# Patient Record
Sex: MALE | Race: WHITE | URBAN - METROPOLITAN AREA
[De-identification: names, ages, dates, MRNs, and addresses within clinical notes are randomized per-mention and may not be internally consistent; named-entity substitution may affect disease eponyms.]

---

## 2017-11-29 ENCOUNTER — HISTORICAL (OUTPATIENT)
Dept: ADMINISTRATIVE | Facility: HOSPITAL | Age: 57
End: 2017-11-29

## 2017-12-27 ENCOUNTER — HISTORICAL (OUTPATIENT)
Dept: PREADMISSION TESTING | Facility: HOSPITAL | Age: 57
End: 2017-12-27

## 2017-12-27 LAB
BUN SERPL-MCNC: 16 MG/DL (ref 7–18)
CALCIUM SERPL-MCNC: 9.1 MG/DL (ref 8.5–10.1)
CHLORIDE SERPL-SCNC: 100 MMOL/L (ref 98–107)
CO2 SERPL-SCNC: 27 MMOL/L (ref 21–32)
CREAT SERPL-MCNC: 0.76 MG/DL (ref 0.7–1.3)
GLUCOSE SERPL-MCNC: 91 MG/DL (ref 74–106)
POTASSIUM SERPL-SCNC: 5.2 MMOL/L (ref 3.5–5.1)
SODIUM SERPL-SCNC: 137 MMOL/L (ref 136–145)

## 2018-01-08 ENCOUNTER — HISTORICAL (OUTPATIENT)
Dept: SURGERY | Facility: HOSPITAL | Age: 58
End: 2018-01-08

## 2018-04-11 ENCOUNTER — HISTORICAL (OUTPATIENT)
Dept: ADMINISTRATIVE | Facility: HOSPITAL | Age: 58
End: 2018-04-11

## 2018-04-26 ENCOUNTER — HISTORICAL (OUTPATIENT)
Dept: SURGERY | Facility: HOSPITAL | Age: 58
End: 2018-04-26

## 2018-05-02 ENCOUNTER — HISTORICAL (OUTPATIENT)
Dept: ADMINISTRATIVE | Facility: HOSPITAL | Age: 58
End: 2018-05-02

## 2018-05-30 ENCOUNTER — HISTORICAL (OUTPATIENT)
Dept: ADMINISTRATIVE | Facility: HOSPITAL | Age: 58
End: 2018-05-30

## 2018-06-20 ENCOUNTER — HISTORICAL (OUTPATIENT)
Dept: ADMINISTRATIVE | Facility: HOSPITAL | Age: 58
End: 2018-06-20

## 2018-07-18 ENCOUNTER — HISTORICAL (OUTPATIENT)
Dept: ADMINISTRATIVE | Facility: HOSPITAL | Age: 58
End: 2018-07-18

## 2018-09-26 ENCOUNTER — HISTORICAL (OUTPATIENT)
Dept: ADMINISTRATIVE | Facility: HOSPITAL | Age: 58
End: 2018-09-26

## 2018-11-15 ENCOUNTER — HISTORICAL (OUTPATIENT)
Dept: SURGERY | Facility: HOSPITAL | Age: 58
End: 2018-11-15

## 2018-11-21 ENCOUNTER — HISTORICAL (OUTPATIENT)
Dept: ADMINISTRATIVE | Facility: HOSPITAL | Age: 58
End: 2018-11-21

## 2018-12-11 ENCOUNTER — HISTORICAL (OUTPATIENT)
Dept: ADMINISTRATIVE | Facility: HOSPITAL | Age: 58
End: 2018-12-11

## 2019-01-08 ENCOUNTER — HISTORICAL (OUTPATIENT)
Dept: ADMINISTRATIVE | Facility: HOSPITAL | Age: 59
End: 2019-01-08

## 2019-03-12 ENCOUNTER — HISTORICAL (OUTPATIENT)
Dept: ADMINISTRATIVE | Facility: HOSPITAL | Age: 59
End: 2019-03-12

## 2022-04-07 ENCOUNTER — HISTORICAL (OUTPATIENT)
Dept: ADMINISTRATIVE | Facility: HOSPITAL | Age: 62
End: 2022-04-07

## 2022-04-24 VITALS
BODY MASS INDEX: 25.21 KG/M2 | HEIGHT: 69 IN | DIASTOLIC BLOOD PRESSURE: 78 MMHG | WEIGHT: 170.19 LBS | SYSTOLIC BLOOD PRESSURE: 115 MMHG

## 2022-04-28 NOTE — OP NOTE
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    OPERATION PERFORMED:  Repeat fusion, right first metatarsophalangeal joint with bone marrow aspirate and injection.    INDICATIONS:  This 57-year-old had the above-mentioned problem.  He had failed conservative therapy.  He had a couple of broken screws and he had a partially fused 1st MTP joint and he did have pain.  He desired operative intervention.  The risks and benefits of the proposed and alternative treatment were explained to the patient.  Questions were solicited and answered.  No assurance given.  Informed consent was obtained.    OPERATION IN DETAIL:  After appropriate operative consents were obtained, the patient was taken to the operating room and placed on the operative in the supine position.  General laryngeal anesthesia was induced without difficulty.  The skin on the right foot and right iliac crest were prepped and draped in a sterile manner using ChloraPrep.     Attention was turned to the right iliac crest first.  Using the Arthrex Aren system, a small stab wound was made over the anterior iliac crest.  After the syringes had been prepared, a trocar was placed in the iliac crest and 60 cc of bone marrow was aspirated without difficulty.  Sterile dressing was placed on this area, and the bone marrow aspirate was prepared, yielding 3 cc of pure bone marrow aspirate with 35 cc of platelet poor plasma.  The bone marrow aspirate was brought back onto the field in a sterile fashion.     Attention was then turned to the right lower extremity.  After exsanguination with an Esmarch bandage, a previously placed thigh tourniquet was elevated.  Previous incision was opened.  Careful dissection was done as the extensor tendon was retracted out of the way.   Patient had intact and broken screws.  The plate was removed, all the broken screws were removed that I could reach were taken out.  Next, the joint was exposed.  He had a partial fusion.  I did make a keyhole with a saw on the dorsum aspect of this 1st MTP joint, and I removed any fibrous tissue.  Next, using a pin, I drilled through and through to have good fenestration of both cortices and to stimulate new bone formation.  Next, 1 cc of the bone marrow aspirate was placed within the joint surface itself.  Next, using the Synthes first MTP fusion, a long 10 degree plate was selected, and locking screws were placed in the holes.  This entire process was watched on the C-arm.  Next, I mixed the remaining 2 cc of bone marrow aspirate with 2 cc of DBX, and this was placed at the joint line and underneath the plate until the area was thoroughly full.  Intraoperative x-rays with the C-arm showed good position of the hardware, and good position of his 1st MTP fusion.  The wound was then gently irrigated.  The extensor tendon was centralized over the plate.  The wound was closed in a layered interrupted fashion with nylon sutures on the skin.  Sterile dressings were applied.  A compressive dressing was applied.  The tourniquet was deflated.     Lap count, sponge count correct x2.  Estimated blood loss was minimal.  Patient tolerated the procedure without difficulty and was transferred to recovery in stable condition.        ______________________________  MD TENZIN Dinero/DEJA  DD:  11/15/2018  Time:  10:47AM  DT:  11/15/2018  Time:  11:03AM  Job #:  [id number]

## 2022-04-28 NOTE — OP NOTE
DATE OF SURGERY:    04/26/2018    SURGEON:  PRESTON De La Cruz MD    ASSISTANT:  Oren Kent certified first assistant.    PREOPERATIVE DIAGNOSIS:  Severe right first metatarsophalangeal joint arthritis.    POSTOPERATIVE DIAGNOSIS:  Severe right first metatarsophalangeal joint arthritis.    OPERATIVE PROCEDURE:  Right first metatarsophalangeal joint fusion.    INDICATIONS FOR OPERATION:  This 57-year-old gentleman had the above-mentioned problem.  I had removed osteophytes from his great toe to give him some relief but he felt that he still had enough pain where he desired a first MTP arthrodesis.  The risks and benefits of the proposed and alternative treatment were explained to the patient.  Questions were solicited and answered.  No assurance given.  Informed consent was obtained.    OPERATION IN DETAIL:  After appropriate operative consents were obtained the patient was taken to the operating room and placed on the operating table in the supine position.  General laryngeal tracheal mask anesthesia was induced without difficulty.  The skin on the right foot was prepped and draped in a sterile manner using ChloraPrep.  After exsanguination with an Esmarch bandage, a previously placed thigh tourniquet was elevated.  The previous incision was opened and dissection was carried down more proximal and distal to both sides of this incision.  Full-thickness flaps were developed on the medial side of the extensor tendon complex and this was retracted out of the way.  Next, soft tissue dissection was done to remove any impinging soft tissue and then very thin wafers of bone were removed from the metatarsal and the proximal phalanx, giving a flat cut osteotomy.  This entire process was watched on the C-arm.  I was able to manually compress this into good position.  It was pinned provisionally with a K-wire.  Next using the Synthes first MTP plate system, I selected a small 0 degree plate and it fit the  patient appropriately.  It was provisionally fixed with an olive wire as I checked my positioning and a combination of locking and cortex screws was used to fix the plate to both the metatarsal and the proximal phalanx.  Intraoperative x-ray showed good reduction of the first MTP joint and good placement of all hardware.  DBX was used to fill any voids.  The wound was thoroughly irrigated.  All bleeders were coagulated.  The extensor complex was used to cover the plate and was closed with interrupted Vicryl sutures.  The wound was then closed in a layered interrupted fashion with nylon sutures on the skin.  Sterile dressings were applied and a compressive dressing was applied.  The tourniquet was deflated.       Lap count and sponge count correct x2.  Estimated blood loss was minimal.  The patient tolerated the procedure without difficulty and was transferred to Recovery Room in stable condition.        ______________________________  M. Robina De La Cruz MD    [facility]/  DD:  04/26/2018  Time:  10:10AM  DT:  04/27/2018  Time:  06:10AM  Job #:  [id number]

## 2022-04-28 NOTE — OP NOTE
DATE OF SURGERY:    01/08/2018    SURGEON:  PRESTON De La Cruz MD    ASSISTANT:  Oren Kent, Certified First Assistant.    PREOPERATIVE DIAGNOSIS:  Hallux rigidus, right foot.    POSTOPERATIVE DIAGNOSIS:  Hallux rigidus, right foot.    OPERATIVE PROCEDURE:  Excision, dorsal osteophytes right 1st metatarsophalangeal joint.    INDICATION FOR OPERATION:  This 57-year-old gentleman had the above-mentioned problem.  He had failed conservative therapy and desired operative intervention.  The risks and benefits of the proposed and alternative treatment were explained to the patient.  Questions were solicited and answered.  No assurance given.  Informed consent was obtained.    OPERATION IN DETAIL:  After appropriate operative consents were obtained, the patient was taken to the operating room, placed on the operating table in supine position.  General laryngeal tracheal mask anesthesia was induced without difficulty.   __________ performed an ankle block for postoperative pain control.  The skin on the right leg was prepped and draped in a sterile manner using ChloraPrep.  After exsanguination with an Esmarch bandage, a previously placed thigh tourniquet was elevated.  Standard dorsal approach to the first MTP joint was done.  Careful dissection was done.  An incision on the medial side of the extensor tendon and careful dissection was done down to the joint.  The C-arm was brought in.  Osteophytes were identified.  The patient was found to have significant osteoarthritic changes with a loss of about 50% of his cartilaginous surface on the first metatarsal.  There was good cartilaginous coverage on the 1st proximal phalanx.  Next, using a saw, osteophytes were removed dorsally from the 1st proximal phalanx as well as from the first metatarsal as well as medially and laterally where other osteophytes were noted.  At this point, I was able to dorsiflex his toe 40 degrees.  Prior, he could only dorsiflex it  to 0 and plantar flex his toe 35 degrees.  The wound was thoroughly irrigated.  All bleeders were coagulated.  The extensor sandoval was closed with interrupted Vicryl suture.  The wound was then closed in a layered, interrupted fashion with nylon sutures on the skin.  Sterile dressings were applied and a compressive dressing was applied.  The tourniquet was deflated.       Lap count and sponge count were correct times two.  Estimated blood loss was minimal.  The patient tolerated the procedure without difficulty and was transferred to Recovery Room in stable condition.        ______________________________  M. Robina De La Cruz MD    TENZIN/KELLEE  DD:  01/08/2018  Time:  08:07AM  DT:  01/08/2018  Time:  04:10PM  Job #:  [id number]

## 2022-05-02 NOTE — HISTORICAL [facility] C[facility]ER
This is a historical note converted from [employer]. Formatting and pictures may have been removed.  Please reference [employer] for original formatting and attached multimedia. Chief Complaint  RT foot, postop of 1st MTP fusion. PL - 2  History of Present Illness  This 57-year-old returns for his right foot.? He is 6 days status post?right first MTP fusion. ?The patient is complaining of less pain than before surgery.? He states that he is no longer using Percocet.  Review of Systems  Constitutional: No fever, weakness, or fatigue.  Ear/Nose/Mouth/Throat: No nasal congestion or sore throat.  Respiratory: No shortness of breath or cough.  Cardiovascular: No chest pain, palpitations, or peripheral edema.  Gastrointestinal: No nausea, vomiting, or abdominal pain.  Genitourinary: No dysuria.  Musculoskeletal: See current complaints  Integumentary: Negative.  Physical Exam  Vitals & Measurements  HR:?62(Peripheral)? BP:?125/87?  HT:?175?cm? HT:?175?cm? WT:?78?kg? WT:?78?kg? BMI:?25.47?  Physical examination shows that the patients wound is clean and dry.?It ?Is not yet completely healed.? He has no evidence of infection. ?He has moderate swelling.? He is motor and sensory intact.? Sterile dressings were applied?and his?heel?bearing shoe?was reapplied.  ?  AP lateral and oblique of the right foot shows that his alignment is in good position and his hardware is in good position.  Assessment/Plan  1.?Primary osteoarthritis of right foot  ? The findings were reviewed with the patient and he expressed understanding.? I will see him back in 1 week for suture removal.? He understands that he cannot yet bathe his foot?completely. ?He may wash his lesser toes but not his incision line.? He is to?continue to use his Darco wedge shoe. ?He is instructed to call if he has any problems prior to his?next appointment.  Ordered:  Clinic Follow up, *Est. 05/09/18 3:00:00 CDT, Order for future visit, Primary osteoarthritis of right foot   Orthopedic aftercare  Encounter for change of dressing, [facility] [place]  Post-Op follow-up visit [id number] PC, Primary osteoarthritis of right foot  Orthopedic aftercare  Encounter for change of dressing, KRISTINAMD JEROD - RICARDA [place], 05/02/18 9:28:00 CDT  ?  2.?Orthopedic aftercare  Ordered:  Clinic Follow up, *Est. 05/09/18 3:00:00 CDT, Order for future visit, Primary osteoarthritis of right foot  Orthopedic aftercare  Encounter for change of dressing, [facility] [place]  Post-Op follow-up visit [id number] PC, Primary osteoarthritis of right foot  Orthopedic aftercare  Encounter for change of dressing, LGMD AMB - AOADRIAN [place], 05/02/18 9:28:00 CDT  ?  3.?Encounter for change of dressing  Ordered:  Clinic Follow up, *Est. 05/09/18 3:00:00 CDT, Order for future visit, Primary osteoarthritis of right foot  Orthopedic aftercare  Encounter for change of dressing, LGMD CHOWDARY [place]  Post-Op follow-up visit [id number] PC, Primary osteoarthritis of right foot  Orthopedic aftercare  Encounter for change of dressing, LGMD JEROD - RICARDA [place], 05/02/18 9:28:00 CDT  ?  Orders:  XR Foot Right Minimum 3 Views, Routine, 05/02/18 8:00:00 CDT, Pain, None, Stretcher, Patient Has IV?, Rad Type, Right foot pain, Not Scheduled, 05/02/18 8:00:00 CDT   Problem List/Past Medical History  Ongoing  Bone spur  Gastric reflux  Hallux rigidus, bilateral  Orthopedic aftercare  Pain of great toe  Primary osteoarthritis of right foot  Historical  No qualifying data  Procedure/Surgical History  Arthrodesis, great toe; metatarsophalangeal joint (04/26/2018), Fusion of Right Metatarsal-Phalangeal Joint with Internal Fixation Device, Open Approach (04/26/2018), Joint Fusion Hallux MTP (Right) (04/26/2018), Excision Ankle Osteophyte (Right) (01/08/2018), Excision of Right Metatarsal, Open Approach (01/08/2018), Excision of Right Toe Phalanx, Open Approach (01/08/2018), Excision or curettage of bone cyst or benign tumor, phalanges of foot (01/08/2018), Excision or  curettage of bone cyst or benign tumor, tarsal or metatarsal, except talus or calcaneus; (01/08/2018), Removal of toenail (1999).  Medications  Percocet 10/325 oral tablet, 2 tab(s), Oral, q6hr, PRN,? ?Not taking  Allergies  penicillin?(pt reports unsure of reaction. reaction was as a young child)  sulfa drugs?(Hives)  Social History  Alcohol  Current, 12/27/2017  Employment/School  Employed, 11/29/2017  Substance Abuse  Never, 11/29/2017  Tobacco  Never smoker, 11/29/2017  Family History  Diabetes mellitus type 2: Mother.  Hyperlipidemia.: Mother and Father.  Hypertension.: Father.  Metastatic cancer: Sister.  Renal disease: Mother.

## 2022-05-02 NOTE — HISTORICAL [facility] C[facility]ER
This is a historical note converted from [employer]. Formatting and pictures may have been removed.  Please reference [employer] for original formatting and attached multimedia. Chief Complaint  RT foot, 3 month follow up with xrays. Patient states foot has been hurting  History of Present Illness  This 57-year-old comes back for his right foot. ?He is 3 months status post?dorsal colectomy of the right first MTP joint. ?He states that that he has better motion he still has significant arthritic pain and would like to discuss a fusion.? He is accompanied by his wife today.  Review of Systems  Constitutional: No fever, weakness, or fatigue.  Ear/Nose/Mouth/Throat: No nasal congestion or sore throat.  Respiratory: No shortness of breath or cough.  Cardiovascular: No chest pain, palpitations, or peripheral edema.  Gastrointestinal: No nausea, vomiting, or abdominal pain.  Genitourinary: No dysuria.  Musculoskeletal: See current complaints; left foot pain  Integumentary: Negative.  Physical Exam  Vitals & Measurements  HT:?175?cm? HT:?175?cm? WT:?79?kg? WT:?79?kg? BMI:?25.8?  Physical examination shows that his alignment is straight.? Dorsiflexion passively is 25? and plantar flexion is 35?.? Actively he has pain with dorsiflexion particularly and he has crepitance consistent with arthritic pain.? He is motor and sensory intact.  ?  AP lateral and oblique of the right foot shows removal of his dorsal osteophyte. ?However he still has significant arthritic changes at his first MTP joint.  Assessment/Plan  1.?Hallux rigidus, bilateral  ? The findings were reviewed with the patient and he expressed understanding.? He is interested in scheduling a first MTP fusion of the right foot in the near future.? I will see him back for preoperative paperwork.? He is instructed to call if he has any problems prior to his next appointment.  Ordered:  Clinic Follow-up Pre Op, 04/11/18 9:44:00 CDT, Order for future visit, Krupa higuera,  bilateral  Orthopedic aftercare  Primary osteoarthritis of right foot, [facility] [place]  Office/Outpatient Visit Level 3 Established [id number] PC, Hallux rigidus, bilateral  Orthopedic aftercare  Primary osteoarthritis of right foot, LGMD AMB - [facility] [place], 04/11/18 9:44:00 CDT  Schedule Surgery, R Foot 1st MTP Fusion, Patients request, 04/11/18 9:44:00 CDT  ?  2.?Orthopedic aftercare  Ordered:  Clinic Follow-up Pre Op, 04/11/18 9:44:00 CDT, Order for future visit, Hallux rigidus, bilateral  Orthopedic aftercare  Primary osteoarthritis of right foot, LG[facility] [place]  Office/Outpatient Visit Level 3 Established [id number] PC, Hallux rigidus, bilateral  Orthopedic aftercare  Primary osteoarthritis of right foot, LGMD AMB - [facility] [place], 04/11/18 9:44:00 CDT  Schedule Surgery, R Foot 1st MTP Fusion, Patients request, 04/11/18 9:44:00 CDT  ?  3.?Primary osteoarthritis of right foot  Ordered:  Clinic Follow-up Pre Op, 04/11/18 9:44:00 CDT, Order for future visit, Hallux rigidus, bilateral  Orthopedic aftercare  Primary osteoarthritis of right foot, LG[facility] [place]  Office/Outpatient Visit Level 3 Established [id number] PC, Hallux rigidus, bilateral  Orthopedic aftercare  Primary osteoarthritis of right foot, LGMD AMB - [facility] [place], 04/11/18 9:44:00 CDT  Schedule Surgery, R Foot 1st MTP Fusion, Patients request, 04/11/18 9:44:00 CDT  ?   Problem List/Past Medical History  Ongoing  Bone spur  Gastric reflux  Hallux rigidus, bilateral  Orthopedic aftercare  Pain of great toe  Primary osteoarthritis of right foot  Historical  No qualifying data  Procedure/Surgical History  Excision Ankle Osteophyte (Right) (01/08/2018), Excision of Right Metatarsal, Open Approach (01/08/2018), Excision of Right Toe Phalanx, Open Approach (01/08/2018), Excision or curettage of bone cyst or benign tumor, phalanges of foot (01/08/2018), Excision or curettage of bone cyst or benign tumor, tarsal or metatarsal, except talus or calcaneus;  (01/08/2018), Removal of toenail (1999).  Medications  No active medications  Allergies  penicillin?(pt reports unsure of reaction. reaction was as a young child)  sulfa drugs?(Hives)  Social History  Alcohol  Current, 12/27/2017  Employment/School  Employed, 11/29/2017  Substance Abuse  Never, 11/29/2017  Tobacco  Never smoker, 11/29/2017  Family History  Diabetes mellitus type 2: Mother.  Hyperlipidemia.: Mother and Father.  Hypertension.: Father.  Metastatic cancer: Sister.  Renal disease: Mother.

## 2022-05-02 NOTE — HISTORICAL [facility] CERNER
This is a historical note converted from [employer]. Formatting and pictures may have been removed.  Please reference [employer] for original formatting and attached multimedia. Chief Complaint  Pt. here for follow up for Rt. foot. pt. states everything feels good with foot.  History of Present Illness  This 57-year-old returns for his right foot.? He is 3 weeks status post right first MTP arthrodesis.? He is complaining of no pain. ?He is accompanied by his wife today.  Review of Systems  Constitutional: No fever, weakness, or fatigue.  Ear/Nose/Mouth/Throat: No nasal congestion or sore throat.  Respiratory: No shortness of breath or cough.  Cardiovascular: No chest pain, palpitations, or peripheral edema.  Gastrointestinal: No nausea, vomiting, or abdominal pain.  Genitourinary: No dysuria.  Musculoskeletal: See current complaints  Integumentary: Negative.  Physical Exam  Vitals & Measurements  HT:?175?cm? HT:?175?cm? WT:?78?kg? WT:?78?kg? BMI:?25.47?  Physical examination shows that he has?wound has healed uneventfully.? His alignment is straight. ?There is no motion at his first MTP joint.? He is motor and sensory intact.  ?  AP lateral and oblique of the right foot shows that his alignment is maintained and his hardware position is maintained.? His fusion is estimated to be 50% solid.  Assessment/Plan  1.?Joint arthrodesis status  ? The patient is to continue using his postoperative shoe.? I will see him back in 3 weeks with new x-rays.? He is instructed to call if he has any problems prior to his next appointment.? He requested no pain medication.  Ordered:  Clinic Follow up, *Est. 06/20/18 3:00:00 CDT, Order for future visit, Joint arthrodesis status  Orthopedic aftercare, [facility]  Post-Op follow-up visit [id number] , Joint arthrodesis status  Orthopedic aftercare, [facility], 05/30/18 9:18:00 CDT  XR Foot Right Minimum 3 Views, Routine, *Est. 06/20/18 3:00:00 CDT, Post Reduction, None,  Ambulatory, Patient Has IV?, Rad Type, Order for future visit, Joint arthrodesis status  Orthopedic aftercare, Not Scheduled, *Est. 06/20/18 3:00:00 CDT  ?  2.?Orthopedic aftercare  Ordered:  Clinic Follow up, *Est. 06/20/18 3:00:00 CDT, Order for future visit, Joint arthrodesis status  Orthopedic aftercare, [facility] [place]  Post-Op follow-up visit [id number] PC, Joint arthrodesis status  Orthopedic aftercare, [facility] [place], 05/30/18 9:18:00 CDT  XR Foot Right Minimum 3 Views, Routine, *Est. 06/20/18 3:00:00 CDT, Post Reduction, None, Ambulatory, Patient Has IV?, Rad Type, Order for future visit, Joint arthrodesis status  Orthopedic aftercare, Not Scheduled, *Est. 06/20/18 3:00:00 CDT  ?   Problem List/Past Medical History  Ongoing  Bone spur  Gastric reflux  Hallux rigidus, bilateral  Joint arthrodesis status  Orthopedic aftercare  Pain of great toe  Primary osteoarthritis of right foot  Historical  No qualifying data  Procedure/Surgical History  Arthrodesis, great toe; metatarsophalangeal joint (04/26/2018), Fusion of Right Metatarsal-Phalangeal Joint with Internal Fixation Device, Open Approach (04/26/2018), Joint Fusion Hallux MTP (Right) (04/26/2018), Excision Ankle Osteophyte (Right) (01/08/2018), Excision of Right Metatarsal, Open Approach (01/08/2018), Excision of Right Toe Phalanx, Open Approach (01/08/2018), Excision or curettage of bone cyst or benign tumor, phalanges of foot (01/08/2018), Excision or curettage of bone cyst or benign tumor, tarsal or metatarsal, except talus or calcaneus; (01/08/2018), Removal of toenail (1999).  Medications  No active medications  Allergies  penicillin?(pt reports unsure of reaction. reaction was as a young child)  sulfa drugs?(Hives)  Social History  Alcohol  Current, 12/27/2017  Employment/School  Employed, 11/29/2017  Substance Abuse  Never, 11/29/2017  Tobacco  Never smoker, 11/29/2017  Family History  Diabetes mellitus type 2: Mother.  Hyperlipidemia.:  Mother and Father.  Hypertension.: Father.  Metastatic cancer: Sister.  Renal disease: Mother.

## 2022-05-02 NOTE — HISTORICAL [facility]
This is a historical note converted from Karen. Formatting and pictures may have been removed.  Please reference [employer] for original formatting and attached multimedia. Chief Complaint  8 week S/P repeat fusion right 1st MTP joint, bone marrow aspiration-Sx 11/15/18-gl 2/13/19-patient is doing well in normal shoe gear  History of Present Illness  This 58-year-old is now?7 weeks status post repeat fusion of the right first MTP joint. ?He states that he is pain-free.? He began wearing some?steel toe boots and he states that he feels well.  Review of Systems  Constitutional: No fever, weakness, or fatigue.  Ear/Nose/Mouth/Throat: No nasal congestion or sore throat.  Respiratory: No shortness of breath or cough.  Cardiovascular: No chest pain, palpitations, or peripheral edema.  Gastrointestinal: No nausea, vomiting, or abdominal pain.  Genitourinary: No dysuria.  Musculoskeletal: See current complaints  Integumentary: Negative.  Physical Exam  Vitals & Measurements  BP:?115/78?  HT:?175?cm? HT:?175?cm? WT:?77.2?kg? WT:?77.2?kg? BMI:?25.21?  Physical examination shows that the patient still has some mild swelling.? His MTP fusion appears to be solid clinically. ?He is motor and sensory intact. ?He has no pain at his first MTP joint.  ?  AP lateral and oblique of the right foot shows that his fusion is progressing nicely. ?It is estimated to be 75-80% solid.  Assessment/Plan  1.?Joint arthrodesis status?Z98.1  ? The [place] of [place]. ?I will see him back in 6 weeks with new x-rays.? He is instructed to call if he has any problems prior to his next appointment.? He is encouraged to continue to ice and elevate his foot.  Ordered:  Clinic Follow up, *Est. 02/19/19 3:00:00 CST, Order for future visit, Joint arthrodesis status  Orthopedic aftercare, [facility]opa[facility]  Post-Op follow-up visit [id number] , Joint arthrodesis status  Orthopedic aftercare, Orthopaedics, 01/08/19 14:44:00 CST  XR Foot Right  Minimum 3 Views, Routine, *Est. 02/19/19 3:00:00 CST, Post Reduction, None, Ambulatory, Rad Type, Order for future visit, Joint arthrodesis status  Orthopedic aftercare, Not Scheduled, *Est. 02/19/19 3:00:00 CST  ?  2.?Orthopedic aftercare?Z47.89  Ordered:  Clinic Follow up, *Est. 02/19/19 3:00:00 CST, Order for future visit, Joint arthrodesis status  Orthopedic aftercare, [facility]  Post-Op follow-up visit [id number] PC, Joint arthrodesis status  Orthopedic aftercare, [facility], 01/08/19 14:44:00 CST  XR Foot Right Minimum 3 Views, Routine, *Est. 02/19/19 3:00:00 CST, Post Reduction, None, Ambulatory, Rad Type, Order for future visit, Joint arthrodesis status  Orthopedic aftercare, Not Scheduled, *Est. 02/19/19 3:00:00 CST  ?   Problem List/Past Medical History  Ongoing  Bone spur  Gastric reflux  Hallux rigidus, bilateral  Joint arthrodesis status  Mechanical complication of internal orthopedic implant  Orthopedic aftercare  Pain of great toe  Primary osteoarthritis of right foot  Historical  No qualifying data  Procedure/Surgical History  Bone Marrow Aspiration (None) (11/15/2018)  Joint Fusion Hallux MTP (Right) (11/15/2018)  Removal Plate, Screws, Pins Lower Extremity (Right) (11/15/2018)  Joint Fusion Hallux MTP (Right) (04/26/2018)  Excision Ankle Osteophyte (Right) (01/08/2018)  Removal of toenail (1999)   Medications  No active medications  Allergies  penicillin?(pt reports unsure of reaction. reaction was as a young child)  sulfa drugs?(Hives)  Social History  Alcohol  Current, 12/27/2017  Employment/School  Employed, 11/29/2017  Substance Abuse  Never, 11/29/2017  Tobacco  Never (less than 100 in lifetime), N/A, 01/08/2019  Never smoker, N/A, 12/11/2018  Never smoker, N/A, 11/27/2018  Never smoker, N/A, 11/21/2018  Never smoker, N/A, 11/15/2018  Never smoker, No, 11/06/2018  Never smoker, 11/29/2017  Family History  Diabetes mellitus type 2: Mother.  Hyperlipidemia.: Mother and  Father.  Hypertension.: Father.  Metastatic cancer: Sister.  Renal disease: Mother.  Health Maintenance  Health Maintenance  ???Pending?(in the next year)  ??? ??OverDue  ??? ? ? ?Diabetes Screening due??and every?  ??? ??Due?  ??? ? ? ?ADL Screening due??01/08/19??and every 1??year(s)  ??? ? ? ?Alcohol Misuse Screening due??01/08/19??and every 1??year(s)  ??? ? ? ?Aspirin Therapy for CVD Prevention due??01/08/19??and every 1??year(s)  ??? ? ? ?Colorectal Screening due??01/08/19??and every?  ??? ? ? ?Lipid Screening due??01/08/19??and every?  ??? ? ? ?Tetanus Vaccine due??01/08/19??and every 10??year(s)  ??? ? ? ?Zoster Vaccine due??01/08/19??and every 100??year(s)  ??? ??Due In Future?  ??? ? ? ?Depression Screening not due until??11/27/19??and every 1??year(s)  ??? ? ? ?Blood Pressure Screening not due until??12/11/19??and every 1??year(s)  ??? ? ? ?Body Mass Index Check not due until??12/11/19??and every 1??year(s)  ???Satisfied?(in the past 1 year)  ??? ??Satisfied?  ??? ? ? ?Blood Pressure Screening on??01/08/19.??Satisfied by Roxy Estrella B.  ??? ? ? ?Body Mass Index Check on??01/08/19.??Satisfied by Debra Estrellaita B.  ??? ? ? ?Depression Screening on??11/27/18.??Satisfied by Debra Estrellaita B.  ??? ? ? ?Influenza Vaccine on??01/08/19.??Satisfied by Debra Estrellaita B.  ??? ? ? ?Obesity Screening on??01/08/19.??Satisfied by Debra Estrellaita B.  ?  ?

## 2022-05-02 NOTE — HISTORICAL [facility]
This is a historical note converted from Karen. Formatting and pictures may have been removed.  Please reference Karen for original formatting and attached multimedia. Chief Complaint  F/U right great toe-patient states he continues to have pain  History of Present Illness  This 57-year-old gentleman returns with his wife. ?He is still having pain in his right foot.? He is now?5 months status post right first MTP fusion.? He does not feel any motion but still has pain.  Review of Systems  Constitutional: No fever, weakness, or fatigue.  Ear/Nose/Mouth/Throat: No nasal congestion or sore throat.  Respiratory: No shortness of breath or cough.  Cardiovascular: No chest pain, palpitations, or peripheral edema.  Gastrointestinal: No nausea, vomiting, or abdominal pain.  Genitourinary: No dysuria.  Musculoskeletal: See current complaints  Integumentary: Negative.  Physical Exam  Vitals & Measurements  BP:?122/82?  HT:?175?cm? HT:?175?cm? WT:?78?kg? WT:?78?kg? BMI:?25.47?  Physical examination shows that the patient has?no clinical motion at his first MTP joint. ?However he does still have pain and swelling in the area.? He has good motion at his IP joint.? He is motor and sensory intact.? His alignment is straight.  ?  AP lateral and oblique of the right foot shows that the patient has broken his 3 proximal screws.? His distal screws are intact and his plate is intact.? However I do not feel that his fusion is completely solid.  Assessment/Plan  1.?Pain in right foot  ? The findings were reviewed with the patient and he expressed understanding.? Patient is interested in scheduling surgery for repeat fusion of his right first MTP joint with possible hardware removal though moving these broken?locking screws may be difficult.? I will see the patient back for preoperative paperwork.? Bone marrow aspirate may be considered?for this patient.? He is instructed to call if he has any problems prior to his next appointment.? He  is interested in scheduling surgery in mid November.  Ordered:  Clinic Follow-up Pre Op, 09/26/18 15:40:00 CDT, Order for future visit, Pain in right foot  Joint arthrodesis status  Mechanical complication of internal orthopedic implant, [facility] [place]  Office/Outpatient Visit Level 3 Established [id number] PC, Pain in right foot  Joint arthrodesis status  Mechanical complication of internal orthopedic implant, [facility] [place], 09/26/18 15:40:00 CDT  Schedule Surgery, Repeat Fusion R 1st MTP Joint, Mid November, 09/26/18 15:40:00 CDT, Pain in right foot  Joint arthrodesis status  Mechanical complication of internal orthopedic implant  ?  2.?Joint arthrodesis status  Ordered:  Clinic Follow-up Pre Op, 09/26/18 15:40:00 CDT, Order for future visit, Pain in right foot  Joint arthrodesis status  Mechanical complication of internal orthopedic implant, [facility] [place]  Office/Outpatient Visit Level 3 Established [id number] PC, Pain in right foot  Joint arthrodesis status  Mechanical complication of internal orthopedic implant, [facility] [place], 09/26/18 15:40:00 CDT  Schedule Surgery, Repeat Fusion R 1st MTP Joint, Mid November, 09/26/18 15:40:00 CDT, Pain in right foot  Joint arthrodesis status  Mechanical complication of internal orthopedic implant  ?  3.?Mechanical complication of internal orthopedic implant  Ordered:  Clinic Follow-up Pre Op, 09/26/18 15:40:00 CDT, Order for future visit, Pain in right foot  Joint arthrodesis status  Mechanical complication of internal orthopedic implant, [facility] [place]  Office/Outpatient Visit Level 3 Established [id number] PC, Pain in right foot  Joint arthrodesis status  Mechanical complication of internal orthopedic implant, [facility] [place], 09/26/18 15:40:00 CDT  Schedule Surgery, Repeat Fusion R 1st MTP Joint, Mid November, 09/26/18 15:40:00 CDT, Pain in right foot  Joint arthrodesis status  Mechanical complication of internal orthopedic implant  ?    Problem List/Past Medical History  Ongoing  Bone spur  Gastric reflux  Hallux rigidus, bilateral  Joint arthrodesis status  Mechanical complication of internal orthopedic implant  Orthopedic aftercare  Pain of great toe  Primary osteoarthritis of right foot  Historical  No qualifying data  Procedure/Surgical History  Arthrodesis, great toe; metatarsophalangeal joint (04/26/2018)  Fusion of Right Metatarsal-Phalangeal Joint with Internal Fixation Device, Open Approach (04/26/2018)  Joint Fusion Hallux MTP (Right) (04/26/2018)  Excision Ankle Osteophyte (Right) (01/08/2018)  Excision of Right Metatarsal, Open Approach (01/08/2018)  Excision of Right Toe Phalanx, Open Approach (01/08/2018)  Excision or curettage of bone cyst or benign tumor, phalanges of foot (01/08/2018)  Excision or curettage of bone cyst or benign tumor, tarsal or metatarsal, except talus or calcaneus; (01/08/2018)  Removal of toenail (1999)   Medications  No active medications  Allergies  penicillin?(pt reports unsure of reaction. reaction was as a young child)  sulfa drugs?(Hives)  Social History  Alcohol  Current, 12/27/2017  Employment/School  Employed, 11/29/2017  Substance Abuse  Never, 11/29/2017  Tobacco  Never smoker, 11/29/2017  Family History  Diabetes mellitus type 2: Mother.  Hyperlipidemia.: Mother and Father.  Hypertension.: Father.  Metastatic cancer: Sister.  Renal disease: Mother.  Health Maintenance  Health Maintenance  ???Pending?(in the next year)  ??? ??Due?  ??? ? ? ?ADL Screening due??09/26/18??and every 1??year(s)  ??? ? ? ?Alcohol Misuse Screening due??09/26/18??and every 1??year(s)  ??? ? ? ?Aspirin Therapy for CVD Prevention due??09/26/18??and every 1??year(s)  ??? ? ? ?Colorectal Screening due??09/26/18??and every?  ??? ? ? ?Diabetes Screening due??09/26/18??and every?  ??? ? ? ?Influenza Vaccine due??09/26/18??and every?  ??? ? ? ?Lipid Screening due??09/26/18??and every?  ??? ? ? ?Tetanus Vaccine due??09/26/18??and  every 10??year(s)  ???Satisfied?(in the past 1 year)  ??? ??Satisfied?  ??? ? ? ?Blood Pressure Screening on??09/26/18.??Satisfied by Roxy Estrella  ??? ? ? ?Body Mass Index Check on??09/26/18.??Satisfied by Roxy Estrella  ??? ? ? ?Depression Screening on??09/26/18.??Satisfied by Roxy Estrella  ??? ? ? ?Diabetes Screening on??12/27/17.??Satisfied by Liza Cox  ??? ? ? ?Influenza Vaccine on??09/26/18.??Satisfied by Roxy Estrella  ??? ? ? ?Obesity Screening on??09/26/18.??Satisfied by Roxy Estrella  ?  ?

## 2022-05-02 NOTE — HISTORICAL [facility]
This is a historical note converted from Karen. Formatting and pictures may have been removed.  Please reference Karen for original formatting and attached multimedia. Chief Complaint  f/u right 1st MTP fusion  History of Present Illness  This 57-year-old comes back for his right foot.? He is now 2-1/2 weeks status post repeat first?MTP?fusion of the right foot.? He is complaining of no pain.? He is accompanied by his wife today.  Review of Systems  Constitutional: No fever, weakness, or fatigue.  Ear/Nose/Mouth/Throat: No nasal congestion or sore throat.  Respiratory: No shortness of breath or cough.  Cardiovascular: No chest pain, palpitations, or peripheral edema.  Gastrointestinal: No nausea, vomiting, or abdominal pain.  Genitourinary: No dysuria.  Musculoskeletal: See current complaints  Integumentary: Negative.  Physical Exam  Vitals & Measurements  HR:?61(Peripheral)? BP:?115/78?  HT:?175?cm? HT:?175?cm? WT:?77.2?kg? WT:?77.2?kg? BMI:?25.21?  Physical examination shows that he has no motion in his first MTP joint.? He has no pain in this area.? He still has moderate swelling.? He is motor and sensory intact.? He has good IP joint motion.  ?  AP lateral and oblique of the right foot shows that his fusion position is unchanged and his hardware position is unchanged.  Assessment/Plan  1.?Joint arthrodesis status?Z98.1  ? The findings were reviewed with the patient and he expressed understanding.? Patient?will continue limited duty.? I will see him back in 4 weeks with new x-rays.? No squatting or climbing at this time. ?He is to continue with his postoperative shoe.? He is instructed to call if he has any problems prior to his next appointment.  Ordered:  Clinic Follow up, *Est. 01/08/19 3:00:00 CST, Order for future visit, Joint arthrodesis status  Orthopedic aftercare, Orthopaedics  Post-Op follow-up visit [id number] , Joint arthrodesis status  [facility]:08[facility]  CST  XR Foot Right Minimum 3 Views, Routine, *Est. 01/08/19 3:00:00 CST, Post-Op, None, Ambulatory, Rad Type, Order for future visit, Joint arthrodesis status  Orthopedic aftercare, Not Scheduled, *Est. 01/08/19 3:00:00 CST  ?  2.?Orthopedic aftercare?Z47.89  Ordered:  Clinic Follow up, *Est. 01/08/19 3:00:00 CST, Order for future visit, Joint arthrodesis status  Orthopedic aftercare, [facility]  Post-Op follow-up visit [id number] PC, Joint arthrodesis status  Orthopedic aftercare, [facility], 12/11/18 15:08:00 CST  XR Foot Right Minimum 3 Views, Routine, *Est. 01/08/19 3:00:00 CST, Post-Op, None, Ambulatory, Rad Type, Order for future visit, Joint arthrodesis status  Orthopedic aftercare, Not Scheduled, *Est. 01/08/19 3:00:00 CST  ?   Problem List/Past Medical History  Ongoing  Bone spur  Gastric reflux  Hallux rigidus, bilateral  Joint arthrodesis status  Mechanical complication of internal orthopedic implant  Orthopedic aftercare  Pain of great toe  Primary osteoarthritis of right foot  Historical  No qualifying data  Procedure/Surgical History  Bone Marrow Aspiration (None) (11/15/2018)  Joint Fusion Hallux MTP (Right) (11/15/2018)  Removal Plate, Screws, Pins Lower Extremity (Right) (11/15/2018)  Joint Fusion Hallux MTP (Right) (04/26/2018)  Excision Ankle Osteophyte (Right) (01/08/2018)  Removal of toenail (1999)   Medications  No active medications  Allergies  penicillin?(pt reports unsure of reaction. reaction was as a young child)  sulfa drugs?(Hives)  Social History  Alcohol  Current, 12/27/2017  Employment/School  Employed, 11/29/2017  Substance Abuse  Never, 11/29/2017  Tobacco  Never smoker, N/A, 12/11/2018  Never smoker, N/A, 11/27/2018  Never smoker, N/A, 11/21/2018  Never smoker, N/A, 11/15/2018  Never smoker, No, 11/06/2018  Never smoker, 11/29/2017  Family History  Diabetes mellitus type 2: Mother.  Hyperlipidemia.: Mother and Father.  Hypertension.: Father.  Metastatic cancer:  Sister.  Renal disease: Mother.  Health Maintenance  Health Maintenance  ???Pending?(in the next year)  ??? ??OverDue  ??? ? ? ?Diabetes Screening due??and every?  ??? ??Due?  ??? ? ? ?ADL Screening due??12/11/18??and every 1??year(s)  ??? ? ? ?Alcohol Misuse Screening due??12/11/18??and every 1??year(s)  ??? ? ? ?Aspirin Therapy for CVD Prevention due??12/11/18??and every 1??year(s)  ??? ? ? ?Colorectal Screening due??12/11/18??and every?  ??? ? ? ?Influenza Vaccine due??12/11/18??and every?  ??? ? ? ?Lipid Screening due??12/11/18??and every?  ??? ? ? ?Tetanus Vaccine due??12/11/18??and every 10??year(s)  ??? ??Due In Future?  ??? ? ? ?Blood Pressure Screening not due until??11/27/19??and every 1??year(s)  ??? ? ? ?Body Mass Index Check not due until??11/27/19??and every 1??year(s)  ??? ? ? ?Depression Screening not due until??11/27/19??and every 1??year(s)  ???Satisfied?(in the past 1 year)  ??? ??Satisfied?  ??? ? ? ?Blood Pressure Screening on??12/11/18.??Satisfied by Carmita Mcknight LPN R.  ??? ? ? ?Body Mass Index Check on??12/11/18.??Satisfied by Juan Luis CULVER Carmita R.  ??? ? ? ?Depression Screening on??11/27/18.??Satisfied by Roxy Estrella  ??? ? ? ?Diabetes Screening on??12/27/17.??Satisfied by Liza Cox  ??? ? ? ?Influenza Vaccine on??11/27/18.??Satisfied by Roxy Estrella  ??? ? ? ?Obesity Screening on??12/11/18.??Satisfied by Carmita Mcknight LPN  ?  ?

## 2022-05-02 NOTE — HISTORICAL [facility]
This is a historical note converted from Karen. Formatting and pictures may have been removed.  Please reference Karen for original formatting and attached multimedia. Chief Complaint  RT foot, 1 month F/U with new xrays  History of Present Illness  This 57-year-old returns for his right foot.? He is now?10 weeks status post right first MTP fusion. ?He has no pain.? He has developed a bit of a soft corn between the end of his first toe and his second toe but otherwise he is doing well.? He is accompanied by his wife.  Review of Systems  Constitutional: No fever, weakness, or fatigue.  Ear/Nose/Mouth/Throat: No nasal congestion or sore throat.  Respiratory: No shortness of breath or cough.  Cardiovascular: No chest pain, palpitations, or peripheral edema.  Gastrointestinal: No nausea, vomiting, or abdominal pain.  Genitourinary: No dysuria.  Musculoskeletal: See current complaints  Integumentary: Negative.  Physical Exam  Vitals & Measurements  HR:?78(Peripheral)? BP:?124/84?  HT:?175?cm? HT:?175?cm? WT:?78?kg? WT:?78?kg? BMI:?25.47?  Physical examination shows that his fusion is solid.? He has no pain.? He has good IP joint motion. ?However he does have a small?area on the lateral border of his first toe distally in his second toe?with a early soft corn formation.? This was completely relieved with a Silipos sock.  ?  AP lateral and oblique of the?right foot shows that his fusion is solid and in good position.  Assessment/Plan  1.?Joint arthrodesis status  ? Findings were reviewed with the patient and he expressed understanding.? Overall he is doing very well.? At this point I can see him back as needed.? He is instructed to call if he has any problems.  Ordered:  Post-Op follow-up visit [id number] PC, Joint arthrodesis status  Orthopedic aftercare, [facility], 07/18/18 9:02:00 CDT  ?  2.?Orthopedic aftercare  Ordered:  Post-Op follow-up visit [id number] PC, Joint arthrodesis status  Orthopedic aftercare, [facility]  [facility], 07/18/18 9:02:00 CDT  ?  Orders:  Clinic Follow-up PRN, 07/18/18 9:02:00 CDT, Future Order, [facility]   Problem List/Past Medical History  Ongoing  Bone spur  Gastric reflux  Hallux rigidus, bilateral  Joint arthrodesis status  Orthopedic aftercare  Pain of great toe  Primary osteoarthritis of right foot  Historical  No qualifying data  Procedure/Surgical History  Arthrodesis, great toe; metatarsophalangeal joint (04/26/2018), Fusion of Right Metatarsal-Phalangeal Joint with Internal Fixation Device, Open Approach (04/26/2018), Joint Fusion Hallux MTP (Right) (04/26/2018), Excision Ankle Osteophyte (Right) (01/08/2018), Excision of Right Metatarsal, Open Approach (01/08/2018), Excision of Right Toe Phalanx, Open Approach (01/08/2018), Excision or curettage of bone cyst or benign tumor, phalanges of foot (01/08/2018), Excision or curettage of bone cyst or benign tumor, tarsal or metatarsal, except talus or calcaneus; (01/08/2018), Removal of toenail (1999).  Medications  No active medications  Allergies  penicillin?(pt reports unsure of reaction. reaction was as a young child)  sulfa drugs?(Hives)  Social History  Alcohol  Current, 12/27/2017  Employment/School  Employed, 11/29/2017  Substance Abuse  Never, 11/29/2017  Tobacco  Never smoker, 11/29/2017  Family History  Diabetes mellitus type 2: Mother.  Hyperlipidemia.: Mother and Father.  Hypertension.: Father.  Metastatic cancer: Sister.  Renal disease: Mother.  Health Maintenance  Health Maintenance  ???Pending?(in the next year)  ??? ??Due?  ??? ? ? ?Alcohol Misuse Screening due??07/18/18??and every 1??year(s)  ??? ? ? ?Aspirin Therapy for CVD Prevention due??07/18/18??and every 1??year(s)  ??? ? ? ?Colorectal Screening due??07/18/18??Variable frequency  ??? ? ? ?Depression Screening due??07/18/18??and every 1??year(s)  ??? ? ? ?Lipid Screening due??07/18/18??Variable frequency  ??? ? ? ?Tetanus Vaccine due??07/18/18??and every  10??year(s)  ??? ??Due In Future?  ??? ? ? ?Influenza Vaccine not due until??10/02/18??and every 1??year(s)  ???Satisfied?(in the past 1 year)  ??? ??Satisfied?  ??? ? ? ?Blood Pressure Screening on??07/18/18.??Satisfied by Oren Kent  ??? ? ? ?Body Mass Index Check on??07/18/18.??Satisfied by Oren Kent.  ??? ? ? ?Diabetes Screening on??12/27/17.??Satisfied by Liza Cox  ??? ? ? ?Obesity Screening on??07/18/18.??Satisfied by Oren Kent  ??? ? ? ?Tobacco Use Screening on??07/18/18.??Satisfied by Oren Kent  ?  ?

## 2022-05-02 NOTE — HISTORICAL [facility]
This is a historical note converted from Karen. Formatting and pictures may have been removed.  Please reference Karen for original formatting and attached multimedia. Chief Complaint  B/L foot, great toes  History of Present Illness  This 56-year-old gentleman comes in complaining of bilateral foot pain worse on the right than the left.? He states that he has had foot pain for many years at the first toes.? The patient was [employer] for 20 years and ran for a long period of time.? 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 He stands for many hours daily.? He has seen another orthopedic surgeon who just gave him pain medication.? He is not interested in taking medication but would like something done surgically.  Review of Systems  Constitutional: No fever, weakness, or fatigue.  Ear/Nose/Mouth/Throat: No nasal congestion or sore throat.  Respiratory: No shortness of breath or cough.  Cardiovascular: No chest pain, palpitations, or peripheral edema.  Gastrointestinal: No nausea, vomiting, or abdominal pain.  Genitourinary: No dysuria.  Musculoskeletal: See current complaints  Integumentary: Negative.  Physical Exam  Vitals & Measurements  HR:?79?(Peripheral)? BP:?140/90?  HT:?172?cm? HT:?172?cm? WT:?78.9?kg? WT:?78.9?kg? BMI:?26.67?  Physical examination shows that he has obvious?first MTP arthritis bilaterally.? The left foot has more significant spurring in the right foot.? Range of motion of the left first MTP joint is dorsiflexion of 5 and plantar flexion of 60.? Dorsiflexion of the right foot is?10 with plantar flexion of 80.? He is motor and sensory intact.? He has mild cavus feet.? He has no evidence of ligamentous laxity.??He has no obvious areas of abnormality.  ?  Standing AP, lateral, and oblique of both feet were taken.? Patient is noted to have severe first MTP arthritis on the left-hand side with  spurring.? On the right-hand side he has moderate?first MTP arthritis with significant spurring.? He has cavus foot deformity bilaterally?but otherwise his x-rays are negative.  Assessment/Plan  1.?Pain of great toe  ? The findings were reviewed with the patient and he expressed understanding.? Options for treatment were discussed with the patient?including simple cheilectomy,?first MTP fusion, or?arthroplasty.? The patient is interested in a simple cheilectomy on the right-hand side.? He will address the left-hand side at a later date.? He states that?the right-hand side is much more symptomatic.? I will see him back for preoperative paperwork.? He requested no pain medication.? He is instructed to call if he has any problems prior to his next appointment.  Ordered:  Clinic Follow-up Pre Op, 11/29/17 14:25:00 CST, Order for future visit, Pain of great toe  Primary osteoarthritis of right foot  Primary osteoarthritis of left foot  Hallux rigidus, bilateral, [facility] [place]  Office/Outpatient Visit Level 3 New [id number] PC, Pain of great toe  Primary osteoarthritis of right foot  Primary osteoarthritis of left foot  Hallux rigidus, bilateral, [facility] [place], 11/29/17 14:25:00 CST  Schedule Surgery, Excision osteophytes R 1st MTP Joint, Jan 2018, 11/29/17 14:25:00 CST  ?  2.?Primary osteoarthritis of right foot  Ordered:  Clinic Follow-up Pre Op, 11/29/17 14:25:00 CST, Order for future visit, Pain of great toe  Primary osteoarthritis of right foot  Primary osteoarthritis of left foot  Hallux rigidus, bilateral, [facility] [place]  Office/Outpatient Visit Level 3 New [id number] PC, Pain of great toe  Primary osteoarthritis of right foot  Primary osteoarthritis of left foot  Hallux rigidus, bilateral, [facility] [place], 11/29/17 14:25:00 CST  Schedule Surgery, Excision osteophytes R 1st MTP Joint, Jan 2018, 11/29/17 14:25:00 CST  ?  3.?Primary osteoarthritis of left foot  Ordered:  Clinic Follow-up Pre  Op, 11/29/17 14:25:00 CST, Order for future visit, Pain of great toe  Primary osteoarthritis of right foot  Primary osteoarthritis of left foot  Hallux rigidus, bilateral, [facility] [place]  Office/Outpatient Visit Level 3 New [id number] PC, Pain of great toe  Primary osteoarthritis of right foot  Primary osteoarthritis of left foot  Hallux rigidus, bilateral, [facility] [place], 11/29/17 14:25:00 CST  Schedule Surgery, Excision osteophytes R 1st MTP Joint, Jan 2018, 11/29/17 14:25:00 CST  ?  4.?Hallux rigidus, bilateral  Ordered:  Clinic Follow-up Pre Op, 11/29/17 14:25:00 CST, Order for future visit, Pain of great toe  Primary osteoarthritis of right foot  Primary osteoarthritis of left foot  Hallux rigidus, bilateral, [facility] [place]  Office/Outpatient Visit Level 3 New [id number] PC, Pain of great toe  Primary osteoarthritis of right foot  Primary osteoarthritis of left foot  Hallux rigidus, bilateral, [facility] [place], 11/29/17 14:25:00 CST  Schedule Surgery, Excision osteophytes R 1st MTP Joint, Jan 2018, 11/29/17 14:25:00 CST  ?   Problem List/Past Medical History  Ongoing  Hallux rigidus, bilateral  Pain of great toe  Primary osteoarthritis of right foot  Historical  Procedure/Surgical History  Removal of toenail (1999)  Medications  ibuprofen 100 mg oral tablet, 200 mg= 2 tab(s), Oral, q6hr, PRN  Allergies  penicillin?(Rash)  sulfa drugs?(Hives)  Social History  Alcohol - 11/29/2017  Current  Employment/School - 11/29/2017  Employed  Substance Abuse - 11/29/2017  Never  Tobacco - 11/29/2017  Never smoker  Family History  Diabetes mellitus type 2: Mother.  Hypertension.: Father.  Metastatic cancer: Sister.

## 2022-05-02 NOTE — HISTORICAL [facility]
This is a historical note converted from Karen. Formatting and pictures may have been removed.  Please reference Karen for original formatting and attached multimedia. Chief Complaint  4 mo S/P repeat fusion right 1st MTP joint, bone marrow aspiration-[facility]-patient states he is doing well with no pain  History of Present Illness  This 58-year-old returns for his right foot. ?He is now 4 months status post repeat fusion of the right first MTP joint. ?He has no pain. 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  Review of Systems  Constitutional: No fever, weakness, or fatigue.  Ear/Nose/Mouth/Throat: No nasal congestion or sore throat.  Respiratory: No shortness of breath or cough.  Cardiovascular: No chest pain, palpitations, or peripheral edema.  Gastrointestinal: No nausea, vomiting, or abdominal pain.  Genitourinary: No dysuria.  Musculoskeletal: See current complaints  Integumentary: Negative.  Physical Exam  Vitals & Measurements  HT:?175?cm? WT:?77.2?kg? BMI:?25.21?  Physical examination shows that the patient is wearing regular shoes and walks with a brisk nonantalgic gait.? He has no pain in his right first MTP joint.? He is motor and sensory intact. ?His alignment is maintained.  ?  AP lateral and oblique of the right foot shows that I can still faintly see his arthrodesis line but his?vision is estimated to be 90-95% solid.  Assessment/Plan  1.?Joint arthrodesis status?Z98.1  ? The findings were reviewed with the patient and he expressed understanding.? The patient states that he is comfortable at this time. ?He would like to come back as needed. ?He is instructed to call if he has any problems.  Ordered:  Office/Outpatient Visit Level 3 Established [id number] PC, Joint arthrodesis status  Orthopedic aftercare, [facility], 03/12/19 13:36:00 CDT  ?  2.?Orthopedic aftercare?Z47.89  Ordered:  Office/Outpatient Visit Level 3 Established [id number] PC, Joint arthrodesis status  [facility]thopedic  aftercare, [facility], 03/12/19 13:36:00 CDT  ?  Orders:  Clinic Follow-up PRN, 03/12/19 13:36:00 CDT, Future Order, Orthopa[facility]ics   Problem List/Past Medical History  Ongoing  Bone spur  Gastric reflux  Hallux rigidus, bilateral  Joint arthrodesis status  Mechanical complication of internal orthopedic implant  Orthopedic aftercare  Pain of great toe  Primary osteoarthritis of right foot  Historical  No qualifying data  Procedure/Surgical History  Bone Marrow Aspiration (None) (11/15/2018)  Joint Fusion Hallux MTP (Right) (11/15/2018)  Removal Plate, Screws, Pins Lower Extremity (Right) (11/15/2018)  Joint Fusion Hallux MTP (Right) (04/26/2018)  Excision Ankle Osteophyte (Right) (01/08/2018)  Removal of toenail (1999)   Medications  No active medications  Allergies  penicillin?(pt reports unsure of reaction. reaction was as a young child)  sulfa drugs?(Hives)  Social History  Alcohol  Current, 12/27/2017  Employment/School  Employed, 11/29/2017  Substance Abuse  Never, 11/29/2017  Tobacco  Never (less than 100 in lifetime), N/A, 03/12/2019  Never (less than 100 in lifetime), N/A, 01/08/2019  Never smoker, N/A, 12/11/2018  Never smoker, N/A, 11/27/2018  Never smoker, N/A, 11/21/2018  Never smoker, N/A, 11/15/2018  Never smoker, No, 11/06/2018  Never smoker, 11/29/2017  Family History  Diabetes mellitus type 2: Mother.  Hyperlipidemia.: Mother and Father.  Hypertension.: Father.  Metastatic cancer: Sister.  Renal disease: Mother.  Health Maintenance  Health Maintenance  ???Pending?(in the next year)  ??? ??OverDue  ??? ? ? ?Diabetes Screening due??and every?  ??? ??Due?  ??? ? ? ?ADL Screening due??03/12/19??and every 1??year(s)  ??? ? ? ?Alcohol Misuse Screening due??03/12/19??and every 1??year(s)  ??? ? ? ?Aspirin Therapy for CVD Prevention due??03/12/19??and every 1??year(s)  ??? ? ? ?Colorectal Screening due??03/12/19??and every?  ??? ? ? ?Lipid Screening due??03/12/19??and every?  ??? ? ? ?Tetanus  Vaccine due??03/12/19??and every 10??year(s)  ??? ? ? ?Zoster Vaccine due??03/12/19??and every 100??year(s)  ??? ??Due In Future?  ??? ? ? ?Depression Screening not due until??11/27/19??and every 1??year(s)  ??? ? ? ?Blood Pressure Screening not due until??01/08/20??and every 1??year(s)  ??? ? ? ?Body Mass Index Check not due until??01/08/20??and every 1??year(s)  ???Satisfied?(in the past 1 year)  ??? ??Satisfied?  ??? ? ? ?Blood Pressure Screening on??01/08/19.??Satisfied by Roxy Estrella B.  ??? ? ? ?Body Mass Index Check on??03/12/19.??Satisfied by Roxy Estrella B.  ??? ? ? ?Depression Screening on??03/12/19.??Satisfied by Roxy Estrella B.  ??? ? ? ?Influenza Vaccine on??03/12/19.??Satisfied by Roxy Estrella B.  ??? ? ? ?Obesity Screening on??03/12/19.??Satisfied by Roxy Estrella B.  ?  ?

## 2022-05-02 NOTE — HISTORICAL [facility]
This is a historical note converted from [employer]. Formatting and pictures may have been removed.  Please reference Karen for original formatting and attached multimedia. Chief Complaint  RT foot  History of Present Illness  This 57-year-old returns for his right foot.? He is now?7 weeks status post right first MTP fusion.? He has no pain.? He is accompanied by his wife.  Review of Systems  Constitutional: No fever, weakness, or fatigue.  Ear/Nose/Mouth/Throat: No nasal congestion or sore throat.  Respiratory: No shortness of breath or cough.  Cardiovascular: No chest pain, palpitations, or peripheral edema.  Gastrointestinal: No nausea, vomiting, or abdominal pain.  Genitourinary: No dysuria.  Musculoskeletal: See current complaints  Integumentary: Negative.  Physical Exam  Vitals & Measurements  HR:?78(Peripheral)? BP:?124/84?  HT:?175?cm? HT:?175?cm? WT:?78?kg? WT:?78?kg? BMI:?25.47?  Physical examination shows that he has no motion at his first MTP joint.? He appears to be motor and sensory intact.? He has no pain in his first MTP joint.? His alignment is maintained.  ?  Standing AP, lateral, and nonweightbearing oblique of the right foot shows that his alignment is maintained and his hardware position is maintained.? His fusion is estimated to be 85% solid.  Assessment/Plan  1.?Joint arthrodesis status  ? The findings were reviewed with the patient and he expressed understanding. ?He may transition to a hard soled shoes.? I would like to see him back in 1 month for another set of x-rays.? He requested no pain medication.? He is instructed to call if he has any problems prior to his next appointment.  Ordered:  Clinic Follow up, *Est. 07/18/18 3:00:00 CDT, Order for future visit, Joint arthrodesis status  Orthopedic aftercare, [facility]  Post-Op follow-up visit [id number] , Joint arthrodesis status  Orthopedic aftercare, [facility], 06/20/18 9:36:00 CDT  XR Foot Right Minimum 3 Views, Routine,  *Est. 07/18/18 3:00:00 CDT, Post Reduction, None, Ambulatory, Patient Has IV?, Rad Type, Order for future visit, Joint arthrodesis status  Orthopedic aftercare, Not Scheduled, *Est. 07/18/18 3:00:00 CDT  ?  2.?Orthopedic aftercare  Ordered:  Clinic Follow up, *Est. 07/18/18 3:00:00 CDT, Order for future visit, Joint arthrodesis status  Orthopedic aftercare, [facility] [place]  Post-Op follow-up visit [id number] PC, Joint arthrodesis status  Orthopedic aftercare, [facility] [place], 06/20/18 9:36:00 CDT  XR Foot Right Minimum 3 Views, Routine, *Est. 07/18/18 3:00:00 CDT, Post Reduction, None, Ambulatory, Patient Has IV?, Rad Type, Order for future visit, Joint arthrodesis status  Orthopedic aftercare, Not Scheduled, *Est. 07/18/18 3:00:00 CDT  ?   Problem List/Past Medical History  Ongoing  Bone spur  Gastric reflux  Hallux rigidus, bilateral  Joint arthrodesis status  Orthopedic aftercare  Pain of great toe  Primary osteoarthritis of right foot  Historical  No qualifying data  Procedure/Surgical History  Arthrodesis, great toe; metatarsophalangeal joint (04/26/2018), Fusion of Right Metatarsal-Phalangeal Joint with Internal Fixation Device, Open Approach (04/26/2018), Joint Fusion Hallux MTP (Right) (04/26/2018), Excision Ankle Osteophyte (Right) (01/08/2018), Excision of Right Metatarsal, Open Approach (01/08/2018), Excision of Right Toe Phalanx, Open Approach (01/08/2018), Excision or curettage of bone cyst or benign tumor, phalanges of foot (01/08/2018), Excision or curettage of bone cyst or benign tumor, tarsal or metatarsal, except talus or calcaneus; (01/08/2018), Removal of toenail (1999).  Medications  No active medications  Allergies  penicillin?(pt reports unsure of reaction. reaction was as a young child)  sulfa drugs?(Hives)  Social History  Alcohol  Current, 12/27/2017  Employment/School  Employed, 11/29/2017  Substance Abuse  Never, 11/29/2017  Tobacco  Never smoker, 11/29/2017  Family  History  Diabetes mellitus type 2: Mother.  Hyperlipidemia.: Mother and Father.  Hypertension.: Father.  Metastatic cancer: Sister.  Renal disease: Mother.  Health Maintenance  Health Maintenance  ???Pending?(in the next year)  ??? ??Due?  ??? ? ? ?Alcohol Misuse Screening due??06/20/18??and every 1??year(s)  ??? ? ? ?Aspirin Therapy for CVD Prevention due??06/20/18??and every 1??year(s)  ??? ? ? ?Colorectal Screening due??06/20/18??Variable frequency  ??? ? ? ?Depression Screening due??06/20/18??and every 1??year(s)  ??? ? ? ?Lipid Screening due??06/20/18??Variable frequency  ??? ? ? ?Tetanus Vaccine due??06/20/18??and every 10??year(s)  ??? ??Due In Future?  ??? ? ? ?Influenza Vaccine not due until??10/02/18??and every 1??year(s)  ???Satisfied?(in the past 1 year)  ??? ??Satisfied?  ??? ? ? ?Blood Pressure Screening on??06/20/18.??Satisfied by Oren Kent.  ??? ? ? ?Body Mass Index Check on??06/20/18.??Satisfied by Oren Kent.  ??? ? ? ?Diabetes Screening on??12/27/17.??Satisfied by Liza Cox  ??? ? ? ?Obesity Screening on??06/20/18.??Satisfied by Oren Kent.  ??? ? ? ?Tobacco Use Screening on??06/20/18.??Satisfied by Oren Kent.  ?  ?

## 2022-05-02 NOTE — HISTORICAL [facility]
This is a historical note converted from [employer]. Formatting and pictures may have been removed.  Please reference [employer] for original formatting and attached multimedia. Chief Complaint  1 week S/P repeat fusion right 1st MTP joint-bone marrow aspiration-Sx 11/15/18-gl 2/13/19-patient is doing well today with minimal pain  History of Present Illness  This 57-year-old returns for his?repeat right first MTP fusion.? He is one-week status post surgery.? States that his pain is significantly improved.? He also had bone marrow aspirate. ?He is accompanied by his wife today.  Review of Systems  Constitutional: No fever, weakness, or fatigue.  Ear/Nose/Mouth/Throat: No nasal congestion or sore throat.  Respiratory: No shortness of breath or cough.  Cardiovascular: No chest pain, palpitations, or peripheral edema.  Gastrointestinal: No nausea, vomiting, or abdominal pain.  Genitourinary: No dysuria.  Musculoskeletal: See current complaints  Integumentary: Negative.  Physical Exam  Vitals & Measurements  HR:?61(Peripheral)? BP:?115/78?  HT:?175?cm? HT:?175?cm? WT:?77.2?kg? WT:?77.2?kg? BMI:?25.21?  Physical examination shows that his wound is clean and dry. ?However he is too swollen for suture removal today.? His wound was dressed in a sterile fashion.? His bone marrow harvest site is clean and dry with no evidence of infection.  ?  AP lateral and oblique of the right foot shows that his hardware is in good position and his toe is well aligned.  Assessment/Plan  1.?Mechanical complication of internal orthopedic implant?T84.498A  ? Return to clinic in 1 week for suture. ?Continue icing and elevating his foot.? No weightbearing on his first MTP joint.? Patient states that he has enough pain medication at this time.? He is instructed to call if he has any problems prior to his next appointment.  Ordered:  Clinic Follow up, *Est. 11/28/18 3:00:00 CST, Order for future visit, Mechanical complication of internal orthopedic  implant  Orthopedic aftercare  Joint arthrodesis status, Orthopa[facility]ics  Post-Op follow-up visit [id number] PC, Mechanical complication of internal orthopedic implant  Orthopedic aftercare  Joint arthrodesis status, Orthopaedics, 11/21/18 8:59:00 CST  ?  2.?Orthopedic aftercare?Z47.89  Ordered:  Clinic Follow up, *Est. 11/28/18 3:00:00 CST, Order for future visit, Mechanical complication of internal orthopedic implant  Orthopedic aftercare  Joint arthrodesis status, [facility]  Post-Op follow-up visit [id number] PC, Mechanical complication of internal orthopedic implant  Orthopedic aftercare  Joint arthrodesis status, Orthopa[facility], 11/21/18 8:59:00 CST  ?  3.?Joint arthrodesis status?Z98.1  Ordered:  Clinic Follow up, *Est. 11/28/18 3:00:00 CST, Order for future visit, Mechanical complication of internal orthopedic implant  Orthopedic aftercare  Joint arthrodesis status, Orth[facility]  Post-Op follow-up visit [id number] PC, Mechanical complication of internal orthopedic implant  Orthopedic aftercare  Joint arthrodesis status, Orthopa[facility]ics, 11/21/18 8:59:00 CST  ?  Orders:  XR Foot Right Minimum 3 Views, Routine, 11/21/18 8:35:00 CST, Post-Op, None, Ambulatory, Patient Has IV?, Rad Type, Hallux rigidus, bilateral, Not Scheduled, 11/21/18 8:35:00 CST   Problem List/Past Medical History  Ongoing  Bone spur  Gastric reflux  Hallux rigidus, bilateral  Joint arthrodesis status  Mechanical complication of internal orthopedic implant  Orthopedic aftercare  Pain of great toe  Primary osteoarthritis of right foot  Historical  No qualifying data  Procedure/Surgical History  Bone Marrow Aspiration (None) (11/15/2018)  Joint Fusion Hallux MTP (Right) (11/15/2018)  Removal Plate, Screws, Pins Lower Extremity (Right) (11/15/2018)  Joint Fusion Hallux MTP (Right) (04/26/2018)  Excision Ankle Osteophyte (Right) (01/08/2018)  Removal of toenail (1999)   Medications  No active medications  Allergies  penicillin?(pt reports  unsure of reaction. reaction was as a young child)  sulfa drugs?(Hives)  Social History  Alcohol  Current, 12/27/2017  Employment/School  Employed, 11/29/2017  Substance Abuse  Never, 11/29/2017  Tobacco  Never smoker, N/A, 11/21/2018  Never smoker, N/A, 11/15/2018  Never smoker, No, 11/06/2018  Never smoker, 11/29/2017  Family History  Diabetes mellitus type 2: Mother.  Hyperlipidemia.: Mother and Father.  Hypertension.: Father.  Metastatic cancer: Sister.  Renal disease: Mother.  Health Maintenance  Health Maintenance  ???Pending?(in the next year)  ??? ??OverDue  ??? ? ? ?Diabetes Screening due??and every?  ??? ??Due?  ??? ? ? ?ADL Screening due??11/21/18??and every 1??year(s)  ??? ? ? ?Alcohol Misuse Screening due??11/21/18??and every 1??year(s)  ??? ? ? ?Aspirin Therapy for CVD Prevention due??11/21/18??and every 1??year(s)  ??? ? ? ?Colorectal Screening due??11/21/18??and every?  ??? ? ? ?Influenza Vaccine due??11/21/18??and every?  ??? ? ? ?Lipid Screening due??11/21/18??and every?  ??? ? ? ?Tetanus Vaccine due??11/21/18??and every 10??year(s)  ???Satisfied?(in the past 1 year)  ??? ??Satisfied?  ??? ? ? ?Blood Pressure Screening on??11/21/18.??Satisfied by Roxy Estrella B.  ??? ? ? ?Body Mass Index Check on??11/21/18.??Satisfied by Roxy Estrella B.  ??? ? ? ?Depression Screening on??11/21/18.??Satisfied by Roxy Estrella B.  ??? ? ? ?Diabetes Screening on??12/27/17.??Satisfied by Liza Cox  ??? ? ? ?Influenza Vaccine on??11/21/18.??Satisfied by Roxy Estrella B.  ??? ? ? ?Obesity Screening on??11/21/18.??Satisfied by Roxy Estrella  ?  ?

## 2024-04-13 ENCOUNTER — HOSPITAL ENCOUNTER (EMERGENCY)
Facility: HOSPITAL | Age: 64
Discharge: HOME | End: 2024-04-13
Attending: INTERNAL MEDICINE
Payer: MEDICARE

## 2024-04-13 VITALS
WEIGHT: 176 LBS | DIASTOLIC BLOOD PRESSURE: 102 MMHG | HEART RATE: 74 BPM | TEMPERATURE: 97 F | RESPIRATION RATE: 20 BRPM | HEIGHT: 69 IN | SYSTOLIC BLOOD PRESSURE: 143 MMHG | BODY MASS INDEX: 26.07 KG/M2 | OXYGEN SATURATION: 99 %

## 2024-04-13 DIAGNOSIS — M76.891 PES ANSERINUS TENDINITIS OF RIGHT LOWER EXTREMITY: Primary | ICD-10-CM

## 2024-04-13 DIAGNOSIS — M25.569 KNEE PAIN: ICD-10-CM

## 2024-04-13 PROCEDURE — 99284 EMERGENCY DEPT VISIT MOD MDM: CPT | Mod: 25

## 2024-04-13 PROCEDURE — 63600175 PHARM REV CODE 636 W HCPCS: Performed by: INTERNAL MEDICINE

## 2024-04-13 PROCEDURE — 96372 THER/PROPH/DIAG INJ SC/IM: CPT | Performed by: INTERNAL MEDICINE

## 2024-04-13 RX ORDER — DICLOFENAC SODIUM 10 MG/G
2 GEL TOPICAL 3 TIMES DAILY
Qty: 100 G | Refills: 1 | Status: SHIPPED | OUTPATIENT
Start: 2024-04-13

## 2024-04-13 RX ORDER — KETOROLAC TROMETHAMINE 30 MG/ML
30 INJECTION, SOLUTION INTRAMUSCULAR; INTRAVENOUS
Status: COMPLETED | OUTPATIENT
Start: 2024-04-13 | End: 2024-04-13

## 2024-04-13 RX ORDER — HYDROCODONE BITARTRATE AND ACETAMINOPHEN 7.5; 325 MG/1; MG/1
1 TABLET ORAL EVERY 6 HOURS PRN
Qty: 12 TABLET | Refills: 0 | Status: SHIPPED | OUTPATIENT
Start: 2024-04-13

## 2024-04-13 RX ORDER — MELOXICAM 7.5 MG/1
7.5 TABLET ORAL DAILY
Qty: 10 TABLET | Refills: 0 | Status: SHIPPED | OUTPATIENT
Start: 2024-04-13 | End: 2024-04-23

## 2024-04-13 RX ORDER — DEXAMETHASONE SODIUM PHOSPHATE 4 MG/ML
8 INJECTION, SOLUTION INTRA-ARTICULAR; INTRALESIONAL; INTRAMUSCULAR; INTRAVENOUS; SOFT TISSUE
Status: COMPLETED | OUTPATIENT
Start: 2024-04-13 | End: 2024-04-13

## 2024-04-13 RX ADMIN — KETOROLAC TROMETHAMINE 30 MG: 30 INJECTION, SOLUTION INTRAMUSCULAR at 09:04

## 2024-04-13 RX ADMIN — DEXAMETHASONE SODIUM PHOSPHATE 8 MG: 4 INJECTION, SOLUTION INTRA-ARTICULAR; INTRALESIONAL; INTRAMUSCULAR; INTRAVENOUS; SOFT TISSUE at 09:04

## 2024-04-13 NOTE — ED PROVIDER NOTES
Source of History:  Patient, no limitations    Chief complaint:  Knee Pain (Pt c/o pain and swelling to right knee onset two days ago, denies definite injury.)      HPI:  Audi Brooks is a 63 y.o. male presenting with Knee Pain (Pt c/o pain and swelling to right knee onset two days ago, denies definite injury.)         The patient complains of pain in the medial aspect of the right knee after kneeling long hours 2 days prior working on a house. Onset of symptoms was 2 days ago. Patient describes pain as sharp/stabbing and tingling. Pain severity now is moderate. Pain is aggravated by movement, weight bearing, and palpation. Pain is alleviated by elevation. Symptoms associated with pain include unable to bear full weight. The patient denies other injuries.        Review of Systems   Constitutional symptoms:  Negative except as documented in HPI.   Skin symptoms:  Negative except as documented in HPI.   HEENT symptoms:  Negative except as documented in HPI.   Respiratory symptoms:  Negative except as documented in HPI.   Cardiovascular symptoms:  Negative except as documented in HPI.   Gastrointestinal symptoms:  Negative except as documented in HPI.    Genitourinary symptoms:  Negative except as documented in HPI.   Musculoskeletal symptoms:  Negative except as documented in HPI.   Neurologic symptoms:  Negative except as documented in HPI.   Psychiatric symptoms:  Negative except as documented in HPI.   Allergy/immunologic symptoms:  Negative except as documented in HPI.             Additional review of systems information: All other systems reviewed and otherwise negative.      Review of patient's allergies indicates:   Allergen Reactions    Penicillins Other (See Comments)     unsure    Sulfa (sulfonamide antibiotics) Hives       PMH:  As per HPI and below:    No past medical history on file.     No family history on file.    No past surgical history on file.         There is no problem list on file for  "this patient.       Physical Exam:    BP (!) 143/102 (BP Location: Left arm, Patient Position: Sitting)   Pulse 74   Temp 97.3 °F (36.3 °C) (Temporal)   Resp 20   Ht 5' 9" (1.753 m)   Wt 79.8 kg (176 lb)   SpO2 99%   BMI 25.99 kg/m²     Nursing note and vital signs reviewed.    General:  Alert, no acute distress.   Skin: Normal for Ethnic Origin, No cyanosis  HEENT: Normocephalic and atraumatic, Vision unchanged, Pupils symmetric, No icterus , Nasal mucosa is pink and moist  Cardiovascular:  Regular rate and rhythm  Chest Wall: No deformity, equal chest rise  Respiratory:  Lungs are clear to auscultation, respirations are non-labored.    Musculoskeletal:  No deformity, Normal perfusion to all extremities, no ligamentous laxity appreciated, no large knee effusion, localized tenderness and mild swelling along pes anserine area  Gastrointestinal:  Soft, Non distended  Neurological:  Alert and oriented, normal motor observed, normal speech observed.    Psychiatric:  Cooperative, appropriate mood & affect.        Labs that have been ordered have been independently reviewed and interpreted by myself.     Old Chart Reviewed.      Initial Impression/ Differential Dx:  Knee contusion, sprain, fracture, referred pain from hip or ankle, lumbar radiculopathy, ITB syndrome, septic arthritis, osteoarthritis, effusion, meniscus injury, cellulitis      MDM:      Reviewed Nurses Note.    Reviewed Pertinent old records.    Orders Placed This Encounter    ORTHOPEDIC BRACING FOR HOME USE - LOWER EXTREMITY    X-Ray Knee Complete 4 Or More Views Right    Crutches    Apply ace wrap    ketorolac injection 30 mg    dexAMETHasone injection 8 mg    meloxicam (MOBIC) 7.5 MG tablet    HYDROcodone-acetaminophen (NORCO) 7.5-325 mg per tablet    diclofenac sodium (VOLTAREN) 1 % Gel                    Labs Reviewed - No data to display       X-Ray Knee Complete 4 Or More Views Right   Final Result      No acute osseous process appreciated. "         Electronically signed by: Víctor Loredo   Date:    04/13/2024   Time:    10:06           No visits with results within 1 Day(s) from this visit.   Latest known visit with results is:   Historical on 12/27/2017   Component Date Value Ref Range Status    Blood Urea Nitrogen 12/27/2017 16.0  7.0 - 18.0 mg/dL Final    Calcium Level Total 12/27/2017 9.1  8.5 - 10.1 mg/dL Final    Carbon Dioxide 12/27/2017 27.0  21.0 - 32.0 mmol/L Final    Chloride 12/27/2017 100  98 - 107 mmol/L Final    Creatinine 12/27/2017 0.76  0.70 - 1.30 mg/dL Final    Glucose Level 12/27/2017 91  74 - 106 mg/dL Final    Potassium Level 12/27/2017 5.2 (H)  3.5 - 5.1 mmol/L Final    Sodium Level 12/27/2017 137  136 - 145 mmol/L Final    Estimated GFR-Non  12/27/2017 >60  mL/min/1.73 m2 Final    Estimated GFR- 12/27/2017 >60  mL/min/1.73 m2 Final       Imaging Results              X-Ray Knee Complete 4 Or More Views Right (Final result)  Result time 04/13/24 10:06:40      Final result by Víctor Loredo MD (04/13/24 10:06:40)                   Impression:      No acute osseous process appreciated.      Electronically signed by: Víctor Loredo  Date:    04/13/2024  Time:    10:06               Narrative:    EXAMINATION:  XR KNEE COMP 4 OR MORE VIEWS RIGHT    CLINICAL HISTORY:  Pain in unspecified knee    TECHNIQUE:  AP, oblique and lateral views of the right knee    COMPARISON:  None    FINDINGS:  No acute fracture identified.  Joint spaces are maintained with anatomic alignment.  No significant knee effusion.                                                                           Diagnostic Impression:    1. Pes anserinus tendinitis of right lower extremity    2. Knee pain         ED Disposition Condition    Discharge              Follow-up Information       [facility] - Emergency Dept.    Specialty: Emergency Medicine  Why: If symptoms worsen  Contact information:  [address]  [facility] [id number] [phone]             Call  Paul Gaxiola DO.    Specialty: Orthopedic Surgery  Contact information:  [address] [facility]  [address] [place] [id number]  [phone]                              ED Prescriptions       Medication Sig Dispense Start Date End Date Auth. Provider    meloxicam (MOBIC) 7.5 MG tablet Take 1 tablet (7.5 mg total) by mouth once daily. for 10 days 10 tablet 4/13/2024 4/23/2024 Saul Hill DO    HYDROcodone-acetaminophen (NORCO) 7.5-325 mg per tablet Take 1 tablet by mouth every 6 (six) hours as needed for Pain. 12 tablet 4/13/2024 -- Saul Hill DO    diclofenac sodium (VOLTAREN) 1 % Gel Apply 2 g topically 3 (three) times daily. 100 g 4/13/2024 -- Saul Hill DO          Follow-up Information       Follow up With Specialties Details Why Contact Info    [facility] - Emergency Dept Emergency Medicine  If symptoms worsen [id number]  [facility] [id number] [phone]    Paul Gaxiola DO Orthopedic Surgery Call   [address] [facility]  [address] [place] [id number]  [phone]               Saul Hill DO  04/13/24 1044

## 2024-04-25 ENCOUNTER — OFFICE VISIT (OUTPATIENT)
Dept: ORTHOPEDICS | Facility: CLINIC | Age: 64
End: 2024-04-25
Payer: MEDICARE

## 2024-04-25 VITALS
DIASTOLIC BLOOD PRESSURE: 102 MMHG | HEART RATE: 62 BPM | HEIGHT: 69 IN | SYSTOLIC BLOOD PRESSURE: 158 MMHG | BODY MASS INDEX: 26.06 KG/M2 | WEIGHT: 175.94 LBS

## 2024-04-25 DIAGNOSIS — S83.8X1A ACUTE MEDIAL MENISCAL INJURY OF KNEE, RIGHT, INITIAL ENCOUNTER: Primary | ICD-10-CM

## 2024-04-25 PROCEDURE — 99203 OFFICE O/P NEW LOW 30 MIN: CPT | Mod: ,,, | Performed by: ORTHOPAEDIC SURGERY

## 2024-04-25 RX ORDER — MELOXICAM 7.5 MG/1
7.5 TABLET ORAL DAILY
COMMUNITY

## 2024-04-25 RX ORDER — MELOXICAM 15 MG/1
15 TABLET ORAL DAILY
Qty: 30 TABLET | Refills: 0 | Status: SHIPPED | OUTPATIENT
Start: 2024-04-25

## 2024-04-25 NOTE — PROGRESS NOTES
"Subjective:    CC: Pain of the Right Knee and Knee Pain (New patient here with Right knee pain. No injury. ED - 4/13/24. X-rays done. Wrapped knee and gave steroid injection. Using crutches and staying off knee since. Pain was localized to 1 area. Today woke up and can put pressure and pain is much better, also taking mobic. BP has been elevated since the ED. )       HPI:  Patient comes in today for his 1st visit.  Patient complains of right knee pain.  Patient states he was doing a lot a kneeling, some work in the bathroom over 3 days.  The next day he had difficulty with bending it and putting weight on it.  He was seen in the ER, was given some medication and a intramuscular injection, he states it has helped a lot.  For the most part he states it is feeling almost back to normal.    ROS: Refer to HPI for pertinent ROS. All other 12 point systems negative.    Objective:  Vitals:    04/25/24 1028 04/25/24 1031   BP: (!) 154/94 (!) 158/102   BP Location: Left arm Right arm   Pulse: 62 62   Weight: 79.8 kg (175 lb 14.8 oz) 79.8 kg (175 lb 14.8 oz)   Height: 5' 9.02" (1.753 m) 5' 9.02" (1.753 m)        Physical Exam:  The patient is well-nourished, well-developed and in no apparent distress, pleasant and cooperative. Examination of the right lower extremity compartments are soft and warm. Skin is intact. There are no signs or symptoms of DVT or infection. There is no obvious joint effusion. There is no erythema. Tender to palpation along the medial joint line , right knee range of motion is 0-110. The knee is stable to exam with varus and valgus stressing. Negative anterior and posterior drawer. Negative Lachman´s.  Questionable medial Katerina's test. Patella grind is positive, Negative for apprehension. Neurovascularly intact distally.    Images: . Images Reviewed and discussed with patient.    Assessment:  1. Acute medial meniscal injury of knee, right, initial encounter        Plan:  At this time we discussed his " physical exam and outside imaging.  Patient likely has an injury to his medial meniscus.  He is already improving with conservative treatment.  There was no catching or locking.  We have discussed another round of Mobic.  He was given a pamphlet on knee range of motion strengthening exercises.  Patient understands to call or return sooner if there is any new problems or difficulties.    Follow UP: No follow-ups on file.